# Patient Record
Sex: MALE | Race: OTHER | URBAN - METROPOLITAN AREA
[De-identification: names, ages, dates, MRNs, and addresses within clinical notes are randomized per-mention and may not be internally consistent; named-entity substitution may affect disease eponyms.]

---

## 2017-09-25 ENCOUNTER — CLINICAL SUPPORT (OUTPATIENT)
Dept: CARDIOLOGY CLINIC | Age: 32
End: 2017-09-25

## 2017-09-25 ENCOUNTER — OFFICE VISIT (OUTPATIENT)
Dept: CARDIOLOGY CLINIC | Age: 32
End: 2017-09-25

## 2017-09-25 VITALS
HEIGHT: 74 IN | BODY MASS INDEX: 29.13 KG/M2 | WEIGHT: 227 LBS | HEART RATE: 73 BPM | DIASTOLIC BLOOD PRESSURE: 88 MMHG | OXYGEN SATURATION: 99 % | SYSTOLIC BLOOD PRESSURE: 130 MMHG

## 2017-09-25 DIAGNOSIS — R94.31 ABNORMAL EKG: Primary | ICD-10-CM

## 2017-09-25 DIAGNOSIS — Z02.89 EXAMINATION, PHYSICAL, EMPLOYEE: ICD-10-CM

## 2017-09-25 DIAGNOSIS — I21.4 NON-ST ELEVATION (NSTEMI) MYOCARDIAL INFARCTION (HCC): Primary | ICD-10-CM

## 2017-09-25 DIAGNOSIS — I21.4 NON-ST ELEVATION (NSTEMI) MYOCARDIAL INFARCTION (HCC): ICD-10-CM

## 2017-09-25 PROBLEM — I21.9 HEART ATTACK (HCC): Status: ACTIVE | Noted: 2017-09-25

## 2017-09-25 NOTE — PROGRESS NOTES
HISTORY OF PRESENT ILLNESS  Guillaume Barnes is a 26 y.o. male. Patient with h/o ? Small mi in 11/14 when in [place] here for cardiac evaluation prior starting his job as a  at [employer] which arielle require wearing a respirator  He has no h/o HTN, HLD DM  PMH: as above  PSH: club foot at age 7  SH: half pack day, occasional etoh,   FH: not significant for early CAD or SCD  HPI  Doing great very active no cp or sob or palpitations  Review of Systems   Respiratory: Negative. Cardiovascular: Negative. Physical Exam  Physical Exam   Blood pressure 130/88, pulse 73, height 6' 2\" (1.88 m), weight 103 kg (227 lb), SpO2 99 %. Constitutional: He is oriented to person, place, and time. He appears well-developed and well-nourished. No distress. HENT: Head: Normocephalic. Eyes: No scleral icterus. Neck: Normal range of motion. Neck supple. No JVD present. No tracheal deviation present. Cardiovascular: Normal rate, regular rhythm, normal heart sounds and intact distal pulses. Exam reveals no gallop and no friction rub. No murmur heard. Pulmonary/Chest: Effort normal and breath sounds normal. No stridor. No respiratory distress, wheezes or  rales. Abdominal: He exhibits no distension. Musculoskeletal: He exhibits no edema. Neurological: He is alert and oriented to person, place, and time. Coordination normal.   Skin: Skin is warm. No rash noted. Not diaphoretic. No erythema. Psychiatric:  Normal mood and affect. Behavior is normal.   No current outpatient prescriptions on file prior to visit. No current facility-administered medications on file prior to visit. ASSESSMENT and PLAN  Abnormal ECG: The patient is completely asymptomatic. His EKG shows normal sinus rhythm and non-specific ST-T wave abnormalities. This seems unchanged from EKG of November 2013.   I have extensively reviewed his records from [place] when he was briefly admitted for epigastric discomfort and at that time told that he had a small MI. There is no evidence at that time in November 2013 of elevation in troponin. There was slight elevation in CPKs but normal CPK  MBs. I do suspect the patient truly did not have an MI at that time. He has remained completely asymptomatic since 2013. He does have nonetheless slightly abnormal EKG and some risk factors in particular tobacco use. At this time I will proceed with a stress echocardiogram.  If normal then I feel that he should be able to proceed with his job with no particular restrictions. His blood pressure seems to be reasonably controlled at this time. His routine labs and blood test and cholesterol closely followed by his primary care physician he tells me. Tobacco cessation stressed.     I will see him back on a as needed basis if the stress echo is normal.

## 2017-09-25 NOTE — MR AVS SNAPSHOT
Visit Information Date & Time Provider Department Dept. Phone Encounter #  
 9/25/2017 10:00 AM Sara Rodriguez MD [facility] [phone] [id number] Your Appointments 9/25/2017 10:00 AM  
New Patient with Sara Rodriguez MD  
[address] ([address]) Appt Note: cardiac clearance for work; cardiac clearance for work  
 [address]  
[address] [facility] [id number] Allergies as of 9/25/2017  Never Reviewed Not on File Current Immunizations  Never Reviewed No immunizations on file. Not reviewed this visit Your Updated Medication List  
  
Notice  As of 9/25/2017  7:19 AM  
 You have not been prescribed any medications. Introducing [facility]! Maximiliano Sanchez introduces [employer] patient portal. Now you can access parts of your medical record, email your doctor's office, and request medication refills online. 1. In your internet browser, go to [email or web address] 2. Click on the First Time User? Click Here link in the Sign In box. You will see the New Member Sign Up page. 3. Enter your [employer] Access Code exactly as it appears below. You will not need to use this code after youve completed the sign-up process. If you do not sign up before the expiration date, you must request a new code. · [employer] Access Code: [email or web address] Expires: 12/24/2017  7:19 AM 
 
4. Enter the last four digits of your Social Security Number (xxxx) and Date of Birth (mm/dd/yyyy) as indicated and click Submit. You will be taken to the next sign-up page. 5. Create a [employer] ID. This will be your [employer] login ID and cannot be changed, so think of one that is secure and easy to remember. 6. Create a [employer] password. You can change your password at any time. 7. Enter your Password Reset Question and Answer. This can be used at a later time if you forget your password. 8. Enter your e-mail address. You will receive e-mail notification when new information is available in [address] 9. Click Sign Up. You can now view and download portions of your medical record. 10. Click the Download Summary menu link to download a portable copy of your medical information. If you have questions, please visit the Frequently Asked Questions section of the [employer] website. Remember, [employer] is NOT to be used for urgent needs. For medical emergencies, dial 911. Now available from your iPhone and Android! Please provide this summary of care documentation to your next provider. If you have any questions after today's visit, please call [phone].